# Patient Record
Sex: FEMALE | Race: WHITE | NOT HISPANIC OR LATINO | Employment: UNEMPLOYED | URBAN - METROPOLITAN AREA
[De-identification: names, ages, dates, MRNs, and addresses within clinical notes are randomized per-mention and may not be internally consistent; named-entity substitution may affect disease eponyms.]

---

## 2023-04-07 ENCOUNTER — OFFICE VISIT (OUTPATIENT)
Dept: PODIATRY | Facility: CLINIC | Age: 43
End: 2023-04-07
Payer: COMMERCIAL

## 2023-04-07 VITALS
BODY MASS INDEX: 23 KG/M2 | HEART RATE: 74 BPM | TEMPERATURE: 97.7 F | DIASTOLIC BLOOD PRESSURE: 77 MMHG | HEIGHT: 62 IN | WEIGHT: 125 LBS | SYSTOLIC BLOOD PRESSURE: 124 MMHG | OXYGEN SATURATION: 98 %

## 2023-04-07 DIAGNOSIS — S92.421A DISPLACED FRACTURE OF DISTAL PHALANX OF RIGHT GREAT TOE, INITIAL ENCOUNTER FOR CLOSED FRACTURE: Primary | ICD-10-CM

## 2023-04-07 PROCEDURE — 99203 OFFICE O/P NEW LOW 30 MIN: CPT | Performed by: PODIATRIST

## 2023-04-07 RX ORDER — ARIPIPRAZOLE 2 MG/1
TABLET ORAL
COMMUNITY
Start: 2023-04-04

## 2023-04-07 RX ORDER — VENLAFAXINE HYDROCHLORIDE 150 MG/1
CAPSULE, EXTENDED RELEASE ORAL
COMMUNITY
Start: 2023-04-04

## 2023-04-07 RX ORDER — VENLAFAXINE HYDROCHLORIDE 75 MG/1
CAPSULE, EXTENDED RELEASE ORAL
COMMUNITY
Start: 2023-04-04

## 2023-04-07 RX ORDER — OXYCODONE HYDROCHLORIDE AND ACETAMINOPHEN 5; 325 MG/1; MG/1
TABLET ORAL
COMMUNITY
Start: 2023-04-02

## 2023-04-07 RX ORDER — APIXABAN 5 MG/1
TABLET, FILM COATED ORAL
COMMUNITY
Start: 2023-02-14

## 2023-04-07 RX ORDER — QUETIAPINE FUMARATE 100 MG/1
TABLET, FILM COATED ORAL
COMMUNITY
Start: 2023-03-30

## 2023-04-07 NOTE — PROGRESS NOTES
[facility] - PODIATRY    Today's Date: 04/07/23    Patient Name: Tomeka Gupta  MRN: [id number]  CSN: [id number]  PCP: Adrianne Jasmine APRN,   Referring Provider: Referring, Self    SUBJECTIVE     Chief Complaint   Patient presents with   • Right Foot - Establish Care, Fracture     Patient reports she fractured her great toe on April 1 by dropping a weight bench while working out onto her foot.      HPI: Tomeka Gupta, a 43 y.o.female, presents to clinic.    Patient is a 43-year-old female presenting with a fracture of her right great toe.  Patient states she was working out in a weight dropped on the toe.  Patient states that it caused significant pain.  She had it x-rayed at urgent care and was confirmed that she broke her big toe.  It is swollen she is here for further treatment.    History reviewed. No pertinent past medical history.  Past Surgical History:   Procedure Laterality Date   • COSMETIC SURGERY Bilateral     breast implants and tummy tuck   • KNEE CARTILAGE SURGERY Right     December 2 2023     Family History   Problem Relation Age of Onset   • Cancer Mother         breast   • Cancer Father         prostate   • Cancer Maternal Aunt         breast   • Hypertension Paternal Aunt    • Hypertension Maternal Grandmother    • Hypertension Maternal Grandfather    • Clotting disorder Maternal Cousin    • Diabetes Neg Hx    • Thyroid disease Neg Hx      Social History     Socioeconomic History   • Marital status:    Tobacco Use   • Smoking status: Never   • Smokeless tobacco: Never   Vaping Use   • Vaping Use: Never used   Substance and Sexual Activity   • Alcohol use: Yes     Comment: occasionally   • Drug use: Never   • Sexual activity: Defer     Allergies   Allergen Reactions   • Allegra Allergy [Fexofenadine Hcl]    • Fexofenadine-Pseudoephed Er Dizziness     Current Outpatient Medications   Medication Sig Dispense Refill   • ARIPiprazole (ABILIFY) 2 MG tablet      • Eliquis 5 MG  tablet tablet      • oxyCODONE-acetaminophen (PERCOCET) 5-325 MG per tablet      • QUEtiapine (SEROquel) 100 MG tablet      • venlafaxine XR (EFFEXOR-XR) 150 MG 24 hr capsule      • venlafaxine XR (EFFEXOR-XR) 75 MG 24 hr capsule      • omeprazole (PriLOSEC) 20 MG capsule Take 1 capsule by mouth every night. (Patient not taking: Reported on 4/7/2023) 15 capsule 0     No current facility-administered medications for this visit.     Review of Systems   Musculoskeletal:        Big toe pain       OBJECTIVE     Vitals:    04/07/23 1031   BP: 124/77   Pulse: 74   Temp: 97.7 °F (36.5 °C)   SpO2: 98%       WBC   Date Value Ref Range Status   02/04/2023 9.23 4.5 - 11.0 10*3/uL Final     RBC   Date Value Ref Range Status   02/04/2023 4.57 4.0 - 5.2 10*6/uL Final     Hemoglobin   Date Value Ref Range Status   02/04/2023 13.7 12.0 - 16.0 g/dL Final     Hematocrit   Date Value Ref Range Status   02/04/2023 40.8 36.0 - 46.0 % Final     MCV   Date Value Ref Range Status   02/04/2023 89.3 80.0 - 100.0 fL Final     MCH   Date Value Ref Range Status   02/04/2023 30.0 26.0 - 34.0 pg Final     MCHC   Date Value Ref Range Status   02/04/2023 33.6 31.0 - 37.0 g/dL Final     RDW   Date Value Ref Range Status   02/04/2023 12.7 12.0 - 16.8 % Final     RDW-SD   Date Value Ref Range Status   05/24/2016 42.9 37.0 - 54.0 fl Final     MPV   Date Value Ref Range Status   02/04/2023 9.6 8.4 - 12.4 fL Final     Platelets   Date Value Ref Range Status   02/04/2023 364 140 - 440 10*3/uL Final     Neutrophil Rel %   Date Value Ref Range Status   02/04/2023 57.0 45 - 80 % Final     Lymphocyte Rel %   Date Value Ref Range Status   02/04/2023 30.2 15 - 50 % Final     Monocyte Rel %   Date Value Ref Range Status   02/04/2023 11.4 0 - 15 % Final     Eosinophil %   Date Value Ref Range Status   02/04/2023 0.3 0 - 7 % Final     Basophil Rel %   Date Value Ref Range Status   02/04/2023 0.7 0 - 2 % Final     Immature Grans %   Date Value Ref Range Status    02/04/2023 0.4 0.0 - 1.0 % Final     Neutrophils Absolute   Date Value Ref Range Status   02/04/2023 5.26 2.0 - 8.8 10*3/uL Final     Lymphocytes Absolute   Date Value Ref Range Status   02/04/2023 2.79 0.7 - 5.5 10*3/uL Final     Monocytes Absolute   Date Value Ref Range Status   02/04/2023 1.05 0.0 - 1.7 10*3/uL Final     Eosinophils Absolute   Date Value Ref Range Status   02/04/2023 0.03 0.0 - 0.8 10*3/uL Final     Basophils Absolute   Date Value Ref Range Status   02/04/2023 0.06 0.0 - 0.2 10*3/uL Final     Immature Grans, Absolute   Date Value Ref Range Status   02/04/2023 0.04 0.00 - 0.10 10*3/uL Final     nRBC   Date Value Ref Range Status   02/04/2023 0 0 /100(WBC) Final         Lab Results   Component Value Date    GLUCOSE 89 05/24/2016    BUN 15 08/05/2020    CREATININE 0.8 08/05/2020    EGFRIFNONA 73 05/24/2016    BCR 19.0 08/05/2020    K 5.2 08/05/2020    CO2 27 08/05/2020    CALCIUM 9.7 08/05/2020    ALBUMIN 4.6 08/05/2020    LABIL2 1.7 08/05/2020    AST 17 08/05/2020    ALT 15 08/05/2020       Patient seen in no apparent distress.      PHYSICAL EXAM:     Foot/Ankle Exam:       General:   Appearance: appears stated age and healthy    Orientation: AAOx3    Affect: appropriate    Gait: unimpaired    Shoe Gear:  Casual shoes    VASCULAR      Right Foot Vascularity   Normal vascular exam    Dorsalis pedis:  2+  Posterior tibial:  2+  Skin Temperature: warm    Edema Grading:  None  CFT:  < 3 seconds  Pedal Hair Growth:  Present  Varicosities: none       Left Foot Vascularity   Normal vascular exam    Dorsalis pedis:  2+  Posterior tibial:  2+  Skin Temperature: warm    Edema Grading:  None  CFT:  < 3 seconds  Pedal Hair Growth:  Present  Varicosities: none        NEUROLOGIC     Right Foot Neurologic   Normal sensation    Light touch sensation:  Normal  Vibratory sensation:  Normal  Hot/Cold sensation: normal    Protective Sensation using Gem-John Monofilament:  10     Left Foot Neurologic   Normal  sensation    Light touch sensation:  Normal  Vibratory sensation:  Normal  Hot/cold sensation: normal    Protective Sensation using [place]-John Monofilament:  10     MUSCULOSKELETAL      Right Foot Musculoskeletal   Tenderness comment:  Pain to right great toe     MUSCLE STRENGTH     Right Foot Muscle Strength   Foot dorsiflexion:  4  Foot plantar flexion:  4  Foot inversion:  4  Foot eversion:  4     Left Foot Muscle Strength   Foot dorsiflexion:  4  Foot plantar flexion:  4  Foot inversion:  4  Foot eversion:  4     RANGE OF MOTION      Right Foot Range of Motion   Foot and ankle ROM within normal limits       Left Foot Range of Motion   Foot and ankle ROM within normal limits       DERMATOLOGIC     Right Foot Dermatologic   Skin: color abnormal and erythema    Skin comment:  Bruising to right great toe  Nails: normal       Left Foot Dermatologic   Skin: skin intact    Nails: normal        RADIOLOGY:        XR Foot Comp Min 3 Vws RT    Result Date: 2023  Narrative: REVIEWING YOUR TEST RESULTS [facility] IS NOT A SUBSTITUTE FOR DISCUSSING THOSE RESULTS WITH YOUR HEALTH CARE PROVIDER. PLEASE CONTACT YOUR PROVIDER VIA [facility] TO DISCUSS ANY QUESTIONS OR CONCERNS YOU MAY HAVE REGARDING THESE TEST RESULTS.  RADIOLOGY REPORT FACILITY:  [facility]      AGE:43 Y          SEX:F PATIENT NAME/:  STEPHANIE CALLOWAY    1980 UNIT NUMBER:  [id number] ACCOUNT NUMBER:  [id number] ACCESSION NUMBER:  [id number] EXAMINATION: XR FOOT COMP MIN 3 VWS RT HISTORY: dropped heavy wt bench onto toe yest.   FINDINGS: Mildly displaced right great toe distal phalanx fracture. Alignment is normal. The joint spaces are normal.  Calcaneal spur. IMPRESSION: Mildly displaced right great toe distal phalanx fracture Dictated by: Will Camarena M.D. Images and Report reviewed and interpreted by: Will Camarena M.D. <PS><Electronically signed by: Will Camarena M.D.> 2023 0944 D:  04/02/2023 0944 T: 04/02/2023 0944      ASSESSMENT/PLAN     Diagnoses and all orders for this visit:    1. Displaced fracture of distal phalanx of right great toe, initial encounter for closed fracture (Primary)      Patient to begin stretching exercises and icing in the evening as tolerated. Discussed compression therapy and resting the extremity.  Anti-inflammatory medication to begin taking if okay by PCP.    Continue wearing open toed shoe    Return to clinic in 6 weeks and repeat x-ray    Comprehensive lower extremity examination and evaluation was performed.    Discussed findings and treatment plan including risks, benefits, and treatment options with patient in detail. Patient agreed with treatment plan.    Medications and allergies reviewed.  Reviewed available lab values along with other pertinent labs.  These were discussed with the patient.    An After Visit Summary was printed and given to the patient at discharge, including (if requested) any available informative/educational handouts regarding diagnosis, treatment, or medications. All questions were answered to patient/family satisfaction. Should symptoms fail to improve or worsen they agree to call or return to clinic or to go to the Emergency Department. Discussed the importance of following up with any needed screening tests/labs/specialist appointments and any requested follow-up recommended by me today. Importance of maintaining follow-up discussed and patient accepts that missed appointments can delay diagnosis and potentially lead to worsening of conditions.    No follow-ups on file., or sooner if acute issues arise.    This document has been electronically signed by Kurtis Harvey DPM on April 7, 2023 12:18 EDT